# Patient Record
Sex: FEMALE | Race: WHITE | Employment: FULL TIME | URBAN - METROPOLITAN AREA
[De-identification: names, ages, dates, MRNs, and addresses within clinical notes are randomized per-mention and may not be internally consistent; named-entity substitution may affect disease eponyms.]

---

## 2018-02-15 PROCEDURE — 88175 CYTOPATH C/V AUTO FLUID REDO: CPT | Performed by: FAMILY MEDICINE

## 2021-01-31 PROBLEM — K76.0 FATTY LIVER: Status: ACTIVE | Noted: 2021-01-01

## 2021-03-26 PROCEDURE — 88305 TISSUE EXAM BY PATHOLOGIST: CPT | Performed by: INTERNAL MEDICINE

## 2021-03-30 PROBLEM — Z86.010 HISTORY OF ADENOMATOUS POLYP OF COLON: Status: ACTIVE | Noted: 2021-03-26

## 2021-08-28 PROBLEM — H61.23 BILATERAL IMPACTED CERUMEN: Status: ACTIVE | Noted: 2021-08-28

## 2023-04-03 RX ORDER — FLUTICASONE PROPIONATE 50 MCG
2 SPRAY, SUSPENSION (ML) NASAL DAILY
COMMUNITY

## 2023-04-03 RX ORDER — LEVOCETIRIZINE DIHYDROCHLORIDE 5 MG/1
5 TABLET, FILM COATED ORAL EVERY EVENING
COMMUNITY

## 2023-04-07 ENCOUNTER — LABORATORY ENCOUNTER (OUTPATIENT)
Dept: LAB | Facility: HOSPITAL | Age: 48
End: 2023-04-07
Attending: OBSTETRICS & GYNECOLOGY
Payer: COMMERCIAL

## 2023-04-07 DIAGNOSIS — N93.9 ABNORMAL UTERINE BLEEDING: ICD-10-CM

## 2023-04-07 LAB
ANTIBODY SCREEN: NEGATIVE
BASOPHILS # BLD AUTO: 0.04 X10(3) UL (ref 0–0.2)
BASOPHILS NFR BLD AUTO: 0.6 %
EOSINOPHIL # BLD AUTO: 0.26 X10(3) UL (ref 0–0.7)
EOSINOPHIL NFR BLD AUTO: 3.6 %
ERYTHROCYTE [DISTWIDTH] IN BLOOD BY AUTOMATED COUNT: 15.5 %
HCT VFR BLD AUTO: 35.9 %
HGB BLD-MCNC: 11.5 G/DL
IMM GRANULOCYTES # BLD AUTO: 0.03 X10(3) UL (ref 0–1)
IMM GRANULOCYTES NFR BLD: 0.4 %
LYMPHOCYTES # BLD AUTO: 1.81 X10(3) UL (ref 1–4)
LYMPHOCYTES NFR BLD AUTO: 25.2 %
MCH RBC QN AUTO: 24.3 PG (ref 26–34)
MCHC RBC AUTO-ENTMCNC: 32 G/DL (ref 31–37)
MCV RBC AUTO: 75.7 FL
MONOCYTES # BLD AUTO: 0.46 X10(3) UL (ref 0.1–1)
MONOCYTES NFR BLD AUTO: 6.4 %
NEUTROPHILS # BLD AUTO: 4.59 X10 (3) UL (ref 1.5–7.7)
NEUTROPHILS # BLD AUTO: 4.59 X10(3) UL (ref 1.5–7.7)
NEUTROPHILS NFR BLD AUTO: 63.8 %
PLATELET # BLD AUTO: 411 10(3)UL (ref 150–450)
RBC # BLD AUTO: 4.74 X10(6)UL
RH BLOOD TYPE: POSITIVE
WBC # BLD AUTO: 7.2 X10(3) UL (ref 4–11)

## 2023-04-07 PROCEDURE — 86901 BLOOD TYPING SEROLOGIC RH(D): CPT

## 2023-04-07 PROCEDURE — 86850 RBC ANTIBODY SCREEN: CPT

## 2023-04-07 PROCEDURE — 36415 COLL VENOUS BLD VENIPUNCTURE: CPT

## 2023-04-07 PROCEDURE — 86900 BLOOD TYPING SEROLOGIC ABO: CPT

## 2023-04-07 PROCEDURE — 85025 COMPLETE CBC W/AUTO DIFF WBC: CPT

## 2023-04-17 ENCOUNTER — ANESTHESIA EVENT (OUTPATIENT)
Dept: SURGERY | Facility: HOSPITAL | Age: 48
End: 2023-04-17
Payer: COMMERCIAL

## 2023-04-18 ENCOUNTER — ANESTHESIA (OUTPATIENT)
Dept: SURGERY | Facility: HOSPITAL | Age: 48
End: 2023-04-18
Payer: COMMERCIAL

## 2023-04-18 ENCOUNTER — HOSPITAL ENCOUNTER (OUTPATIENT)
Facility: HOSPITAL | Age: 48
Setting detail: HOSPITAL OUTPATIENT SURGERY
Discharge: HOME | End: 2023-04-18
Attending: OBSTETRICS & GYNECOLOGY | Admitting: OBSTETRICS & GYNECOLOGY
Payer: COMMERCIAL

## 2023-04-18 VITALS
BODY MASS INDEX: 27.86 KG/M2 | OXYGEN SATURATION: 95 % | RESPIRATION RATE: 16 BRPM | WEIGHT: 183.81 LBS | DIASTOLIC BLOOD PRESSURE: 59 MMHG | SYSTOLIC BLOOD PRESSURE: 110 MMHG | TEMPERATURE: 98 F | HEIGHT: 68 IN | HEART RATE: 106 BPM

## 2023-04-18 DIAGNOSIS — N93.9 ABNORMAL UTERINE BLEEDING: Primary | ICD-10-CM

## 2023-04-18 LAB — B-HCG UR QL: NEGATIVE

## 2023-04-18 PROCEDURE — 81025 URINE PREGNANCY TEST: CPT

## 2023-04-18 PROCEDURE — 0UT94ZZ RESECTION OF UTERUS, PERCUTANEOUS ENDOSCOPIC APPROACH: ICD-10-PCS | Performed by: OBSTETRICS & GYNECOLOGY

## 2023-04-18 PROCEDURE — 0UT14ZZ RESECTION OF LEFT OVARY, PERCUTANEOUS ENDOSCOPIC APPROACH: ICD-10-PCS | Performed by: OBSTETRICS & GYNECOLOGY

## 2023-04-18 PROCEDURE — 0UT74ZZ RESECTION OF BILATERAL FALLOPIAN TUBES, PERCUTANEOUS ENDOSCOPIC APPROACH: ICD-10-PCS | Performed by: OBSTETRICS & GYNECOLOGY

## 2023-04-18 PROCEDURE — 88307 TISSUE EXAM BY PATHOLOGIST: CPT | Performed by: OBSTETRICS & GYNECOLOGY

## 2023-04-18 PROCEDURE — 8E0W4CZ ROBOTIC ASSISTED PROCEDURE OF TRUNK REGION, PERCUTANEOUS ENDOSCOPIC APPROACH: ICD-10-PCS | Performed by: OBSTETRICS & GYNECOLOGY

## 2023-04-18 RX ORDER — SODIUM CHLORIDE, SODIUM LACTATE, POTASSIUM CHLORIDE, CALCIUM CHLORIDE 600; 310; 30; 20 MG/100ML; MG/100ML; MG/100ML; MG/100ML
INJECTION, SOLUTION INTRAVENOUS CONTINUOUS
Status: DISCONTINUED | OUTPATIENT
Start: 2023-04-18 | End: 2023-04-18

## 2023-04-18 RX ORDER — ROCURONIUM BROMIDE 10 MG/ML
INJECTION, SOLUTION INTRAVENOUS AS NEEDED
Status: DISCONTINUED | OUTPATIENT
Start: 2023-04-18 | End: 2023-04-18 | Stop reason: SURG

## 2023-04-18 RX ORDER — MEPERIDINE HYDROCHLORIDE 25 MG/ML
12.5 INJECTION INTRAMUSCULAR; INTRAVENOUS; SUBCUTANEOUS AS NEEDED
Status: DISCONTINUED | OUTPATIENT
Start: 2023-04-18 | End: 2023-04-18

## 2023-04-18 RX ORDER — CLINDAMYCIN PHOSPHATE 900 MG/50ML
900 INJECTION INTRAVENOUS ONCE
Status: DISCONTINUED | OUTPATIENT
Start: 2023-04-18 | End: 2023-04-18 | Stop reason: HOSPADM

## 2023-04-18 RX ORDER — KETOROLAC TROMETHAMINE 30 MG/ML
INJECTION, SOLUTION INTRAMUSCULAR; INTRAVENOUS AS NEEDED
Status: DISCONTINUED | OUTPATIENT
Start: 2023-04-18 | End: 2023-04-18 | Stop reason: SURG

## 2023-04-18 RX ORDER — NEOSTIGMINE METHYLSULFATE 1 MG/ML
INJECTION, SOLUTION INTRAVENOUS AS NEEDED
Status: DISCONTINUED | OUTPATIENT
Start: 2023-04-18 | End: 2023-04-18 | Stop reason: SURG

## 2023-04-18 RX ORDER — BUPIVACAINE HYDROCHLORIDE 5 MG/ML
INJECTION, SOLUTION EPIDURAL; INTRACAUDAL AS NEEDED
Status: DISCONTINUED | OUTPATIENT
Start: 2023-04-18 | End: 2023-04-18 | Stop reason: HOSPADM

## 2023-04-18 RX ORDER — HEPARIN SODIUM 5000 [USP'U]/ML
5000 INJECTION, SOLUTION INTRAVENOUS; SUBCUTANEOUS ONCE
Status: COMPLETED | OUTPATIENT
Start: 2023-04-18 | End: 2023-04-18

## 2023-04-18 RX ORDER — HYDROMORPHONE HYDROCHLORIDE 1 MG/ML
0.4 INJECTION, SOLUTION INTRAMUSCULAR; INTRAVENOUS; SUBCUTANEOUS EVERY 5 MIN PRN
Status: DISCONTINUED | OUTPATIENT
Start: 2023-04-18 | End: 2023-04-18

## 2023-04-18 RX ORDER — CEFAZOLIN SODIUM 1 G/3ML
INJECTION, POWDER, FOR SOLUTION INTRAMUSCULAR; INTRAVENOUS AS NEEDED
Status: DISCONTINUED | OUTPATIENT
Start: 2023-04-18 | End: 2023-04-18 | Stop reason: SURG

## 2023-04-18 RX ORDER — NALOXONE HYDROCHLORIDE 0.4 MG/ML
80 INJECTION, SOLUTION INTRAMUSCULAR; INTRAVENOUS; SUBCUTANEOUS AS NEEDED
Status: DISCONTINUED | OUTPATIENT
Start: 2023-04-18 | End: 2023-04-18

## 2023-04-18 RX ORDER — SCOLOPAMINE TRANSDERMAL SYSTEM 1 MG/1
1 PATCH, EXTENDED RELEASE TRANSDERMAL ONCE
Status: DISCONTINUED | OUTPATIENT
Start: 2023-04-18 | End: 2023-04-18 | Stop reason: HOSPADM

## 2023-04-18 RX ORDER — GLYCOPYRROLATE 0.2 MG/ML
INJECTION, SOLUTION INTRAMUSCULAR; INTRAVENOUS AS NEEDED
Status: DISCONTINUED | OUTPATIENT
Start: 2023-04-18 | End: 2023-04-18 | Stop reason: SURG

## 2023-04-18 RX ORDER — MIDAZOLAM HYDROCHLORIDE 1 MG/ML
INJECTION INTRAMUSCULAR; INTRAVENOUS AS NEEDED
Status: DISCONTINUED | OUTPATIENT
Start: 2023-04-18 | End: 2023-04-18 | Stop reason: SURG

## 2023-04-18 RX ORDER — HYDROMORPHONE HYDROCHLORIDE 1 MG/ML
0.2 INJECTION, SOLUTION INTRAMUSCULAR; INTRAVENOUS; SUBCUTANEOUS EVERY 5 MIN PRN
Status: DISCONTINUED | OUTPATIENT
Start: 2023-04-18 | End: 2023-04-18

## 2023-04-18 RX ORDER — MIDAZOLAM HYDROCHLORIDE 1 MG/ML
1 INJECTION INTRAMUSCULAR; INTRAVENOUS EVERY 5 MIN PRN
Status: DISCONTINUED | OUTPATIENT
Start: 2023-04-18 | End: 2023-04-18

## 2023-04-18 RX ORDER — ONDANSETRON 2 MG/ML
INJECTION INTRAMUSCULAR; INTRAVENOUS AS NEEDED
Status: DISCONTINUED | OUTPATIENT
Start: 2023-04-18 | End: 2023-04-18 | Stop reason: SURG

## 2023-04-18 RX ORDER — HYDROCODONE BITARTRATE AND ACETAMINOPHEN 5; 325 MG/1; MG/1
1 TABLET ORAL ONCE AS NEEDED
Status: COMPLETED | OUTPATIENT
Start: 2023-04-18 | End: 2023-04-18

## 2023-04-18 RX ORDER — LIDOCAINE HYDROCHLORIDE 10 MG/ML
INJECTION, SOLUTION EPIDURAL; INFILTRATION; INTRACAUDAL; PERINEURAL AS NEEDED
Status: DISCONTINUED | OUTPATIENT
Start: 2023-04-18 | End: 2023-04-18 | Stop reason: SURG

## 2023-04-18 RX ORDER — HYDROMORPHONE HYDROCHLORIDE 1 MG/ML
INJECTION, SOLUTION INTRAMUSCULAR; INTRAVENOUS; SUBCUTANEOUS
Status: COMPLETED
Start: 2023-04-18 | End: 2023-04-18

## 2023-04-18 RX ORDER — ONDANSETRON 2 MG/ML
4 INJECTION INTRAMUSCULAR; INTRAVENOUS EVERY 6 HOURS PRN
Status: DISCONTINUED | OUTPATIENT
Start: 2023-04-18 | End: 2023-04-18

## 2023-04-18 RX ORDER — DEXAMETHASONE SODIUM PHOSPHATE 4 MG/ML
VIAL (ML) INJECTION AS NEEDED
Status: DISCONTINUED | OUTPATIENT
Start: 2023-04-18 | End: 2023-04-18 | Stop reason: SURG

## 2023-04-18 RX ORDER — ACETAMINOPHEN 500 MG
1000 TABLET ORAL ONCE AS NEEDED
Status: COMPLETED | OUTPATIENT
Start: 2023-04-18 | End: 2023-04-18

## 2023-04-18 RX ORDER — HYDROCODONE BITARTRATE AND ACETAMINOPHEN 5; 325 MG/1; MG/1
2 TABLET ORAL ONCE AS NEEDED
Status: COMPLETED | OUTPATIENT
Start: 2023-04-18 | End: 2023-04-18

## 2023-04-18 RX ORDER — PROCHLORPERAZINE EDISYLATE 5 MG/ML
5 INJECTION INTRAMUSCULAR; INTRAVENOUS EVERY 8 HOURS PRN
Status: DISCONTINUED | OUTPATIENT
Start: 2023-04-18 | End: 2023-04-18

## 2023-04-18 RX ORDER — HYDROCODONE BITARTRATE AND ACETAMINOPHEN 5; 325 MG/1; MG/1
1 TABLET ORAL EVERY 6 HOURS PRN
Qty: 10 TABLET | Refills: 0 | Status: SHIPPED | OUTPATIENT
Start: 2023-04-18

## 2023-04-18 RX ORDER — HYDROMORPHONE HYDROCHLORIDE 1 MG/ML
0.6 INJECTION, SOLUTION INTRAMUSCULAR; INTRAVENOUS; SUBCUTANEOUS EVERY 5 MIN PRN
Status: DISCONTINUED | OUTPATIENT
Start: 2023-04-18 | End: 2023-04-18

## 2023-04-18 RX ORDER — ACETAMINOPHEN 500 MG
1000 TABLET ORAL ONCE
Status: DISCONTINUED | OUTPATIENT
Start: 2023-04-18 | End: 2023-04-18 | Stop reason: HOSPADM

## 2023-04-18 RX ADMIN — ROCURONIUM BROMIDE 10 MG: 10 INJECTION, SOLUTION INTRAVENOUS at 12:02:00

## 2023-04-18 RX ADMIN — ROCURONIUM BROMIDE 20 MG: 10 INJECTION, SOLUTION INTRAVENOUS at 11:30:00

## 2023-04-18 RX ADMIN — DEXAMETHASONE SODIUM PHOSPHATE 8 MG: 4 MG/ML VIAL (ML) INJECTION at 10:49:00

## 2023-04-18 RX ADMIN — SODIUM CHLORIDE, SODIUM LACTATE, POTASSIUM CHLORIDE, CALCIUM CHLORIDE: 600; 310; 30; 20 INJECTION, SOLUTION INTRAVENOUS at 11:29:00

## 2023-04-18 RX ADMIN — LIDOCAINE HYDROCHLORIDE 50 MG: 10 INJECTION, SOLUTION EPIDURAL; INFILTRATION; INTRACAUDAL; PERINEURAL at 10:43:00

## 2023-04-18 RX ADMIN — SODIUM CHLORIDE, SODIUM LACTATE, POTASSIUM CHLORIDE, CALCIUM CHLORIDE: 600; 310; 30; 20 INJECTION, SOLUTION INTRAVENOUS at 12:56:00

## 2023-04-18 RX ADMIN — NEOSTIGMINE METHYLSULFATE 4 MG: 1 INJECTION, SOLUTION INTRAVENOUS at 12:56:00

## 2023-04-18 RX ADMIN — KETOROLAC TROMETHAMINE 30 MG: 30 INJECTION, SOLUTION INTRAMUSCULAR; INTRAVENOUS at 12:52:00

## 2023-04-18 RX ADMIN — ROCURONIUM BROMIDE 50 MG: 10 INJECTION, SOLUTION INTRAVENOUS at 10:43:00

## 2023-04-18 RX ADMIN — GLYCOPYRROLATE 0.6 MG: 0.2 INJECTION, SOLUTION INTRAMUSCULAR; INTRAVENOUS at 12:56:00

## 2023-04-18 RX ADMIN — SODIUM CHLORIDE, SODIUM LACTATE, POTASSIUM CHLORIDE, CALCIUM CHLORIDE: 600; 310; 30; 20 INJECTION, SOLUTION INTRAVENOUS at 10:39:00

## 2023-04-18 RX ADMIN — MIDAZOLAM HYDROCHLORIDE 2 MG: 1 INJECTION INTRAMUSCULAR; INTRAVENOUS at 10:39:00

## 2023-04-18 RX ADMIN — ONDANSETRON 4 MG: 2 INJECTION INTRAMUSCULAR; INTRAVENOUS at 12:52:00

## 2023-04-18 RX ADMIN — ROCURONIUM BROMIDE 10 MG: 10 INJECTION, SOLUTION INTRAVENOUS at 12:27:00

## 2023-04-18 RX ADMIN — CEFAZOLIN SODIUM 2 G: 1 INJECTION, POWDER, FOR SOLUTION INTRAMUSCULAR; INTRAVENOUS at 10:46:00

## 2023-04-18 NOTE — INTERVAL H&P NOTE
Pre-op Diagnosis: UTERINE FIBROID  ABDNORMAL UTERINE BLEEDING    The above referenced H&P was reviewed by Esther Adam MD on 4/18/2023, the patient was examined and no significant changes have occurred in the patient's condition since the H&P was performed. I discussed with the patient and/or legal representative the potential benefits, risks and side effects of this procedure; the likelihood of the patient achieving goals; and potential problems that might occur during recuperation. I discussed reasonable alternatives to the procedure, including risks, benefits and side effects related to the alternatives and risks related to not receiving this procedure. We will proceed with procedure as planned.

## 2023-04-18 NOTE — ANESTHESIA POSTPROCEDURE EVALUATION
[address] [facility],[address] Floor Patient Status:  Hospital Outpatient Surgery   Age/Gender 53year old female MRN [id number]   [facility] SURGERY Attending Florian Boyd MD   Hosp Day # 0 PCP Milad Rogers MD       Anesthesia Post-op Note    ROBOT ASSISTED TOTAL LAPAROSCOPIC HYSTERECTOMY, BILATERAL SALPINGECTOMY, LEFT OOPHORECTOMY, URETEROLYSIS, CYSTOSCOPY    Procedure Summary     Date: 04/18/23 Room / Location: [facility] OR 08 / 1404 [facility] OR    Anesthesia Start: 1658 Anesthesia Stop: 5431    Procedure: ROBOT ASSISTED TOTAL LAPAROSCOPIC HYSTERECTOMY, BILATERAL SALPINGECTOMY, LEFT OOPHORECTOMY, URETEROLYSIS, CYSTOSCOPY (Bilateral: Abdomen) Diagnosis: (UTERINE FIBROIDABDNORMAL UTERINE BLEEDING)    Surgeons: Florian Boyd MD Anesthesiologist: Yonas Bernal MD    Anesthesia Type: general ASA Status: 2          Anesthesia Type: general    Vitals Value Taken Time   /73 04/18/23 1310   Temp 98.1 04/18/23 1310   Pulse 84 04/18/23 1310   Resp 18 04/18/23 1310   SpO2 100 04/18/23 1310       Patient Location: PACU    Anesthesia Type: general    Airway Patency: patent and extubated    Postop Pain Control: adequate    Mental Status: preanesthetic baseline    Nausea/Vomiting: none    Cardiopulmonary/Hydration status: stable euvolemic    Complications: no apparent anesthesia related complications    Postop vital signs: stable    Dental Exam: Unchanged from Preop    Patient to be discharged from PACU when criteria met.

## 2023-04-18 NOTE — ANESTHESIA PROCEDURE NOTES
Airway  Date/Time: 4/18/2023 10:45 AM  Urgency: elective    Airway not difficult    General Information and Staff    Patient location during procedure: OR  Anesthesiologist: Brenda Gallagher MD  Resident/CRNA: Gaby Cheng CRNA  Performed: CRNA   Performed by: Gaby Cheng CRNA  Authorized by: Brenda Gallagher MD      Indications and Patient Condition  Indications for airway management: anesthesia  Spontaneous Ventilation: absent  Sedation level: deep  Preoxygenated: yes  Patient position: sniffing  Mask difficulty assessment: 1 - vent by mask    Final Airway Details  Final airway type: endotracheal airway      Successful airway: ETT  Cuffed: yes   Successful intubation technique: direct laryngoscopy  Facilitating devices/methods: cricoid pressure and intubating stylet  Endotracheal tube insertion site: oral  Blade: Keyanna  Blade size: #3  ETT size (mm): 7.5    Cormack-Lehane Classification: grade IIB - view of arytenoids or posterior of glottis only  Placement verified by: chest auscultation and capnometry   Cuff volume (mL): 7  Measured from: lips  ETT to lips (cm): 21  Number of attempts at approach: 1  Number of other approaches attempted: 0    Additional Comments  Dentition per pre op

## 2023-04-18 NOTE — OPERATIVE REPORT
OPERATIVE REPORT   DATE OF SURGERY: 4/18/23  PREOPERATIVE DIAGNOSIS:  AUB, fibroids  POSTOPERATIVE DIAGNOSIS:  same  PROCEDURE PERFORMED: Robotically assisted total laparoscopic hysterectomy, bilateral salpingectomies, cystoscopy, lysis of adhesions, ureterolysis, Left oophrectomy  SURGEON; MERE Moffett  ANESTHESIA: General.   FINDINGS: Uterus is approximately 9 week size with  AV and a pedunculated fibroid. The right tube and ovary was normal.  The left ovary contained a large endometrioma which was densely adherent to the pelvic sidewall posterior uterus and rectum. There were filmy adhesions in the cul de sac as well. THere were no other endometrial implants noted. The intestinal contents including appendix were normal.  The upper quadrants including liver and diaphragms were also normal.  After ureteral dissection and dissection of the left ovary it appeared compromised therefore was removed. TECHNIQUE: The patient was prepped and draped in a sterile fashion after satisfactory general anesthesia was given. Patient was examined. A Turner was placed in the bladder. Alonza Houston was inserted into the uterus and secured with an 0 Vicryl suture. A stab incision was made infraumbilically in the skin with a scalpel and a Veress needle was then introduced into the abdominal cavity. The abdomen was insufflated until 15 mm of pressure was reached. The Veress needle was then removed. An 8-mm incision was made approximately 3 cm above the umbilicus and a 8-mm trocar was placed. The laparoscope was introduced. Ureters were visualized bilaterally. Left and right lower quadrant 8 mm trocars were placed using local anesthetic and direct visualization. A 5 mm RUQ assistant port was then placed under visualization. The robot was docked to the camera and two 8 mm ports. Lysis of adhesions was performed which ruptured a left ovarian endometrioma.   Additional colon and cul de sac adhesions were lysed.  Dr Belle Reyna was called for intraop consultation and performed lysis of adhesions near the rectum, ureterolysis and ligation of the left uterine vessels. Colpotomy was then performed. The tubes were removed by coagulating the mesosalpinges then cutting. The anterior leaf of the broad ligament was lifted and incised to the midline. The uterine artery and vein on the side were identified. They were thoroughly coagulated and cut. The right mesosalpinx was then lifted, coagulated and cut, again leaving the tube attached to the the uterus. On the right side, the suspensory ligament of the ovary was coagulated and cut. The round ligament was coagulated and cut. The anterior leaf of the broad ligament was lifted and incised in the midline to connect with the other side. The bladder was dissected off the anterior uterus and cervix by blunt dissection. The uterine artery and vein on this side were skeletonized, coagulated, and cut. The uterus was noted to be blanching nicely. Additional dissection was undertaken to uncover the white fascia. The fascia was then incised in the midline anteriorly and the VCare cup was visualized. The dissection was then carried posteriorly until the cervix was freed from the vagina. The uterus was removed by vagina. The peducnculated fibroid had been  from the uterus and was placed in the vagina and removed. The left ovary was removed in that fashion as well. The vaginal cuff was closed robotically using 0 vicryl sutures X 4. The cuff closure needles were removed. Snow thrombotic material was placed in the pelvis due to ragged tissue. The abdomen was then re-insufflated and inspected. The pelvis was irrigated and hemostasis was noted to be excellent. The robot was detached from the instruments and undocked. The cuff closure needles were removed. Cystoscopy was performed revealing an intact bladder mucosa and brisk ureteral jets bilaterally.    The gas was then allowed to escape from the abdomen. Incisions were closed with subcuticular 4-0 Monocryl suture and Dermabond. The Turner catheter was removed. The ureters were noted to be peristalsing nicely prior to closure and the urine was clear at the end of the procedure. The patient tolerated the procedure well with 75 mL blood loss and went to recovery in good condition.

## 2023-04-20 NOTE — OPERATIVE REPORT
[facility]    PATIENT'S NAME: Demetris Elliott   ATTENDING PHYSICIAN: Sergio Kuhn M.D. OPERATING PHYSICIAN: Latrell Juarez M.D. PATIENT ACCOUNT#:   [de-identified]    LOCATION:  [address] [place] [address]  MEDICAL RECORD #:   [id number]       YOB: 1975  ADMISSION DATE:       04/18/2023      OPERATION DATE:  04/18/2023    OPERATIVE REPORT    PREOPERATIVE DIAGNOSIS:  POSTOPERATIVE DIAGNOSIS:  PROCEDURE:  1. Intraoperative consultation. 2.   Lysis of pelvic adhesions. 3.   Bilateral retroperitoneal ureterolysis. 4.   Completion of hysterectomy. 5.   Left salpingo-oophorectomy. INDICATIONS:  I was asked by Dr. Anitra Mazariegos to come and help in the surgery. OPERATIVE TECHNIQUE:  On my arrival in the operating room, patient was being operated with [employer] robot. Inspection revealed both round ligaments had been coagulated and cut, the anterior peritoneum had been opened, and both retroperitoneal spaces were opened. The anatomy was distorted. Then, sitting at console, dissection was started on the right side first.  The right ureter was identified and was dissected off and mobilized. Then, similar dissection was performed on the left side by opening the retroperitoneal spaces and dissection was continued to mobilize the ureter away from the medial adhesion. There were adhesions of the rectosigmoid to the back of the uterus. Lysis of these adhesions was performed. The rectovaginal space was opened. Rectum was dropped off from the back of the vagina. Then, coagulation and transection of the left infundibulopelvic ligament was performed as Dr. Sergio Navarro recommended the left ovary needs to be removed. Dissection was performed anteriorly, and bladder were dissected off completely. Then, colpotomy was performed, and specimen was isolated. Then, the areas of the dissection were inspected. Complete hemostasis was obtained. At this time, surgery was again taken over by Dr. Sergio Navarro. At the end of the procedure, there were no complication and estimated blood loss less than 5 mL.     Dictated By Latrell Juarez M.D.  d: 04/19/2023 13:47:50  t: 04/19/2023 14:21:46  [facility] [id number]  ET/

## 2024-11-19 NOTE — H&P
Gyn H&P      HPI:   Rhona Quinn is a 56year old  who presents for an annual gynecological exam. She is currently using nothing for for birth control with monthly cycles. Pt with periods 14-28 d/ 3-7 with up to 6 days heavy with cramping. Previous OCP use not tolerated. . She has no other gyn complaints. Breast Cancer-related family history includes Breast Cancer in her paternal aunt; Cancer in her mother. US done with 8.7 cm ut, fibroids 3.2 and 3.2 cm and pedunculated 5.3 cm. EMB unable to be performed. Pt with a narrow vagina on exam.  Pt presents for robotic TLH BS cysto with vaginal possible abdominal morcellation. Other Medical Problems:   Past Medical History:   Diagnosis Date   Allergic rhinitis   Anxiety state   Asthma   Depression   Fatty liver 2021   Identified on ultrasound   History of adenomatous polyp of colon 2021   Tubular adenoma in ascending colon; Dr. Janeen Venegas   Insomnia     Previous Surgeries:   Past Surgical History:   Procedure Laterality Date   Colonoscopy 2021   Tubular adenoma in ascending colon; Dr. Padilla Adjutant N/A 3/26/2021   Biopsies negative for H. pylori, no intestinal metaplasia; Dr. Janeen Venegas   Other surgical history   L ULNAR TUNNEL   Other surgical history   DENTAL   Other surgical history   PODIATRIC   Other surgical history   rectal fissure repair   Sinus surgery    For chronic sinusitis     ROS:   Constitutional: negative; feels well  Psychiatric: negative  Neurological: negative. Endocrine: negative; denies any thyroid symptoms. Skin/Breast: negative; Denies any breast pain, lumps, or discharge. Denies any skin lesions.   Gastrointestinal: negative  Genitourinary: negative for dysuria or USI  Musculoskeletal: negative  Other:alert and oriented X 3  Menses:     PHYSICAL EXAM:   A/O X 3, affect appropriate  HEENT: normal  Nodes: none palpable  Abdomen: soft, NT, no masses felt  Pelvic: Ext Gen Normal  Vag normal  Uterus av 9 weeks, Monitor: The problem is unchanged.  Evaluation: Labs/tests ordered, see encounter summary.  Assessment/Treatment:  Continue current treatment/monitoring regimen.    Last A1C was 9.8. on long and short acting insulin. Will need to adjust as needed. Likely will need future endo eval. Ozempic/mounjaro would help with hunger and obesity however patient has chronic intermittent n/v/constipation and this could exacerbate those  symptoms.   irregular  Adnexa: normal  Ext: normal without edema or evidence of DVT    IMPRESSION:   Abnormal uterine bleeding (aub) (primary encounter diagnosis)  Uterine leiomyoma, unspecified location    PLAN:   BSE's discussed and encouraged  Orders Placed This Encounter  Expanded, Low Comp (80036)    Requested Prescriptions     No prescriptions requested or ordered in this encounter     None  Robotic TLH BS cysto possible vag or abd morcellation  The procedure, risks, and complications were reviewed with the patient including but not limited to infection, bleeding, blood transfusion, bowel, bladder and ureteral injury, fistula, deep venous thrombosis and anesthetic risks. Simultaneous BSO was also discussed including the average age of menopause, incidence of ovarian cancer of approximately 1 in [de-identified] in the [place], menopausal symptoms, and pros and cons of HRT postoperatively. Increased risks with fibroids and narrow vaginal access discussed  Her questions were answered. The patient verbalized her understanding. Total face to face time was 15 minutes, more than 50% of the time was spent in counseling and/or coordination of care related to Fibroids, surgery, risks, recovery.   Deuce Us MD

## (undated) DEVICE — SUT MONOCRYL 4-0 PS-2 Y496G

## (undated) DEVICE — VIOLET BRAIDED (POLYGLACTIN 910), SYNTHETIC ABSORBABLE SUTURE: Brand: COATED VICRYL

## (undated) DEVICE — LIGHT HANDLE

## (undated) DEVICE — ELECTRO LUBE IS A SINGLE PATIENT USE DEVICE THAT IS INTENDED TO BE USED ON ELECTROSURGICAL ELECTRODES TO REDUCE STICKING.: Brand: KEY SURGICAL ELECTRO LUBE

## (undated) DEVICE — SLEEVE KENDALL SCD EXPRESS MED

## (undated) DEVICE — STERILE POLYISOPRENE POWDER-FREE SURGICAL GLOVES: Brand: PROTEXIS

## (undated) DEVICE — LAPAROVUE VISIBILITY SYSTEM LAPAROSCOPIC SOLUTIONS: Brand: LAPAROVUE

## (undated) DEVICE — BIPOLAR GRASPER, LONG: Brand: ENDOWRIST

## (undated) DEVICE — COLUMN DRAPE

## (undated) DEVICE — TIP COVER ACCESSORY

## (undated) DEVICE — TUBING CYSTO

## (undated) DEVICE — HUNTER GASPER TIP, DISPOSABLE: Brand: RENEW

## (undated) DEVICE — DERMABOND CLOSURE 0.7ML TOPICL

## (undated) DEVICE — MEGA SUTURECUT ND: Brand: ENDOWRIST

## (undated) DEVICE — COVER WAND RF DETECT

## (undated) DEVICE — SURGICEL SNOW ABS 4X4

## (undated) DEVICE — ARM DRAPE

## (undated) DEVICE — PROGRASP FORCEPS: Brand: ENDOWRIST

## (undated) DEVICE — 40580 - THE PINK PAD - ADVANCED TRENDELENBURG POSITIONING KIT: Brand: 40580 - THE PINK PAD - ADVANCED TRENDELENBURG POSITIONING KIT

## (undated) DEVICE — VCARE SMALL, UTERINE MANIPULATOR, VAGINAL-CERVICAL-AHLUWALIA'S-RETRACTOR-ELEVATOR: Brand: VCARE

## (undated) DEVICE — INSUFFLATION NEEDLE TO ESTABLISH PNEUMOPERITONEUM.: Brand: INSUFFLATION NEEDLE

## (undated) DEVICE — DRAPE,TOP,102X53,STERILE: Brand: MEDLINE

## (undated) DEVICE — AIRSEAL 5 MM ACCESS PORT AND LOW PROFILE OBTURATOR WITH BLADELESS OPTICAL TIP, 120 MM LENGTH: Brand: AIRSEAL

## (undated) DEVICE — MONOPOLAR CURVED SCISSORS: Brand: ENDOWRIST

## (undated) DEVICE — BLADELESS OBTURATOR: Brand: WECK VISTA

## (undated) DEVICE — ENDOCUT SCISSOR TIP, DISPOSABLE: Brand: RENEW

## (undated) DEVICE — CANNULA SEAL

## (undated) DEVICE — GYN LAP/ROBOTIC: Brand: MEDLINE INDUSTRIES, INC.

## (undated) DEVICE — TRI-LUMEN FILTERED TUBE SET WITH ACTIVATED CHARCOAL FILTER: Brand: AIRSEAL